# Patient Record
Sex: MALE | URBAN - METROPOLITAN AREA
[De-identification: names, ages, dates, MRNs, and addresses within clinical notes are randomized per-mention and may not be internally consistent; named-entity substitution may affect disease eponyms.]

---

## 2024-04-17 ENCOUNTER — OV NP (OUTPATIENT)
Dept: URBAN - METROPOLITAN AREA CLINIC 60 | Facility: CLINIC | Age: 54
End: 2024-04-17

## 2025-02-27 ENCOUNTER — TELEPHONE ENCOUNTER (OUTPATIENT)
Dept: URBAN - METROPOLITAN AREA CLINIC 60 | Facility: CLINIC | Age: 55
End: 2025-02-27

## 2025-02-28 ENCOUNTER — DASHBOARD ENCOUNTERS (OUTPATIENT)
Age: 55
End: 2025-02-28

## 2025-03-03 ENCOUNTER — OFFICE VISIT (OUTPATIENT)
Dept: URBAN - METROPOLITAN AREA CLINIC 60 | Facility: CLINIC | Age: 55
End: 2025-03-03
Payer: COMMERCIAL

## 2025-03-03 ENCOUNTER — LAB OUTSIDE AN ENCOUNTER (OUTPATIENT)
Dept: URBAN - METROPOLITAN AREA CLINIC 60 | Facility: CLINIC | Age: 55
End: 2025-03-03

## 2025-03-03 VITALS
TEMPERATURE: 98.5 F | WEIGHT: 156.4 LBS | DIASTOLIC BLOOD PRESSURE: 72 MMHG | OXYGEN SATURATION: 99 % | HEART RATE: 78 BPM | BODY MASS INDEX: 21.9 KG/M2 | HEIGHT: 71 IN | RESPIRATION RATE: 12 BRPM | SYSTOLIC BLOOD PRESSURE: 130 MMHG

## 2025-03-03 DIAGNOSIS — K62.5 RECTAL BLEEDING: ICD-10-CM

## 2025-03-03 DIAGNOSIS — K52.89 (LYMPHOCYTIC) MICROSCOPIC COLITIS: ICD-10-CM

## 2025-03-03 DIAGNOSIS — R10.33 PERIUMBILICAL ABDOMINAL PAIN: ICD-10-CM

## 2025-03-03 PROCEDURE — 99204 OFFICE O/P NEW MOD 45 MIN: CPT

## 2025-03-03 RX ORDER — FAMOTIDINE 20 MG/1
TAKE ONE TABLET BY MOUTH ONE TIME DAILY TABLET ORAL
Qty: 14 UNSPECIFIED | Refills: 0 | Status: ACTIVE | COMMUNITY

## 2025-03-03 RX ORDER — PANTOPRAZOLE SODIUM 40 MG/1
1 TABLET 1/2 TO 1 HOUR BEFORE MORNING MEAL TABLET, DELAYED RELEASE ORAL ONCE A DAY
Status: ACTIVE | COMMUNITY

## 2025-03-03 NOTE — HPI-TODAY'S VISIT:
Patient is a 54-year-old male new patient to Dr. Cosby coming in complaining of rectal bleeding, history of stomach ulcers and GERD.  Prior history of drug abuse now referred by PCP to psychiatry. Referred by [facility] for colonoscopy screening.  Past medical history of GERD and depression.  Recently at urgent care 2/20/2025 for concern of rectal bleeding and given referral to GI.  Patient comes in accompanied by his mom.  He complains of intermittent rectal bleeding, diarrhea, abdominal pain ongoing since his teenage years.  She now mostly always has diarrhea and at times bloody diarrhea multiple times per day.  His last episode of rectal bleeding was around a month ago.  Still having multiple [place] type VII stools per day.  Abdominal pain around the umbilicus area that comes and goes.  He reports a history of peptic ulcer disease in his mid 20s.  No prior colonoscopy.  Recent blood work reviewed and summarized below.  Denies any use of blood thinners, presence of pacemaker or defib. Denies history of sleep apnea. No prior MI, TIA, CVA or cardiac stenting. No known cardiac or pulmonary issues.

## 2025-03-03 NOTE — PHYSICAL EXAM GASTROINTESTINAL
Abdomen , soft, nontender, nondistended , no guarding or rigidity , no masses palpable , normal bowel sounds , Liver and Spleen,  no hepatosplenomegaly , liver nontender. defers rectal

## 2025-03-04 ENCOUNTER — CLAIMS CREATED FROM THE CLAIM WINDOW (OUTPATIENT)
Dept: URBAN - METROPOLITAN AREA SURGERY CENTER 4 | Facility: SURGERY CENTER | Age: 55
End: 2025-03-04
Payer: COMMERCIAL

## 2025-03-04 ENCOUNTER — CLAIMS CREATED FROM THE CLAIM WINDOW (OUTPATIENT)
Dept: URBAN - METROPOLITAN AREA CLINIC 4 | Facility: CLINIC | Age: 55
End: 2025-03-04
Payer: COMMERCIAL

## 2025-03-04 DIAGNOSIS — K21.9 GASTRO-ESOPHAGEAL REFLUX DISEASE WITHOUT ESOPHAGITIS: ICD-10-CM

## 2025-03-04 DIAGNOSIS — K31.7 POLYP OF STOMACH AND DUODENUM: ICD-10-CM

## 2025-03-04 DIAGNOSIS — K31.7 GASTRIC POLYP: ICD-10-CM

## 2025-03-04 DIAGNOSIS — K29.00 ACUTE GASTRITIS WITHOUT HEMORRHAGE, UNSPECIFIED GASTRITIS TYPE: ICD-10-CM

## 2025-03-04 DIAGNOSIS — K44.9 DIAPHRAGMATIC HERNIA WITHOUT OBSTRUCTION OR GANGRENE: ICD-10-CM

## 2025-03-04 DIAGNOSIS — Z93.4 GASTROJEJUNOSTOMY TUBE STATUS: ICD-10-CM

## 2025-03-04 DIAGNOSIS — K29.70 GASTRITIS, UNSPECIFIED, WITHOUT BLEEDING: ICD-10-CM

## 2025-03-04 DIAGNOSIS — Z98.0 HISTORY OF BYPASS GASTROJEJUNOSTOMY: ICD-10-CM

## 2025-03-04 DIAGNOSIS — K31.89 OTHER DISEASES OF STOMACH AND DUODENUM: ICD-10-CM

## 2025-03-04 PROCEDURE — 88313 SPECIAL STAINS GROUP 2: CPT | Performed by: PATHOLOGY

## 2025-03-04 PROCEDURE — 43239 EGD BIOPSY SINGLE/MULTIPLE: CPT | Performed by: INTERNAL MEDICINE

## 2025-03-04 PROCEDURE — 88305 TISSUE EXAM BY PATHOLOGIST: CPT | Performed by: PATHOLOGY

## 2025-03-04 PROCEDURE — 88312 SPECIAL STAINS GROUP 1: CPT | Performed by: PATHOLOGY

## 2025-03-04 PROCEDURE — 00731 ANES UPR GI NDSC PX NOS: CPT | Performed by: NURSE ANESTHETIST, CERTIFIED REGISTERED

## 2025-03-04 RX ORDER — FAMOTIDINE 20 MG/1
TAKE ONE TABLET BY MOUTH ONE TIME DAILY TABLET ORAL
Qty: 14 UNSPECIFIED | Refills: 0 | Status: ACTIVE | COMMUNITY

## 2025-03-04 RX ORDER — PANTOPRAZOLE SODIUM 40 MG/1
1 TABLET 1/2 TO 1 HOUR BEFORE MORNING MEAL TABLET, DELAYED RELEASE ORAL ONCE A DAY
Status: ACTIVE | COMMUNITY

## 2025-03-10 ENCOUNTER — TELEPHONE ENCOUNTER (OUTPATIENT)
Dept: URBAN - METROPOLITAN AREA CLINIC 64 | Facility: CLINIC | Age: 55
End: 2025-03-10

## 2025-03-12 ENCOUNTER — TELEPHONE ENCOUNTER (OUTPATIENT)
Dept: URBAN - METROPOLITAN AREA CLINIC 60 | Facility: CLINIC | Age: 55
End: 2025-03-12

## 2025-03-13 ENCOUNTER — OFFICE VISIT (OUTPATIENT)
Dept: URBAN - METROPOLITAN AREA SURGERY CENTER 4 | Facility: SURGERY CENTER | Age: 55
End: 2025-03-13
Payer: COMMERCIAL

## 2025-03-13 ENCOUNTER — LAB OUTSIDE AN ENCOUNTER (OUTPATIENT)
Dept: URBAN - METROPOLITAN AREA SURGERY CENTER 4 | Facility: SURGERY CENTER | Age: 55
End: 2025-03-13

## 2025-03-13 ENCOUNTER — CLAIMS CREATED FROM THE CLAIM WINDOW (OUTPATIENT)
Dept: URBAN - METROPOLITAN AREA CLINIC 4 | Facility: CLINIC | Age: 55
End: 2025-03-13
Payer: COMMERCIAL

## 2025-03-13 DIAGNOSIS — K57.30 DIVERTICULOSIS OF LARGE INTESTINE WITHOUT PERFORATION OR ABSCESS WITHOUT BLEEDING: ICD-10-CM

## 2025-03-13 DIAGNOSIS — K64.1 SECOND DEGREE HEMORRHOIDS: ICD-10-CM

## 2025-03-13 DIAGNOSIS — R19.7 DIARRHEA, UNSPECIFIED TYPE: ICD-10-CM

## 2025-03-13 DIAGNOSIS — K92.1 HEMATOCHEZIA: ICD-10-CM

## 2025-03-13 DIAGNOSIS — K63.89 OTHER SPECIFIED DISEASES OF INTESTINE: ICD-10-CM

## 2025-03-13 PROCEDURE — 88342 IMHCHEM/IMCYTCHM 1ST ANTB: CPT | Performed by: PATHOLOGY

## 2025-03-13 PROCEDURE — 88313 SPECIAL STAINS GROUP 2: CPT | Performed by: PATHOLOGY

## 2025-03-13 PROCEDURE — 88305 TISSUE EXAM BY PATHOLOGIST: CPT | Performed by: PATHOLOGY

## 2025-03-13 PROCEDURE — 45380 COLONOSCOPY AND BIOPSY: CPT | Performed by: INTERNAL MEDICINE

## 2025-03-13 PROCEDURE — 00811 ANES LWR INTST NDSC NOS: CPT | Performed by: NURSE ANESTHETIST, CERTIFIED REGISTERED

## 2025-03-13 RX ORDER — PANTOPRAZOLE SODIUM 40 MG/1
1 TABLET 1/2 TO 1 HOUR BEFORE MORNING MEAL TABLET, DELAYED RELEASE ORAL ONCE A DAY
Status: ACTIVE | COMMUNITY

## 2025-03-13 RX ORDER — FAMOTIDINE 20 MG/1
TAKE ONE TABLET BY MOUTH ONE TIME DAILY TABLET ORAL
Qty: 14 UNSPECIFIED | Refills: 0 | Status: ACTIVE | COMMUNITY

## 2025-03-24 ENCOUNTER — LAB OUTSIDE AN ENCOUNTER (OUTPATIENT)
Dept: URBAN - METROPOLITAN AREA CLINIC 60 | Facility: CLINIC | Age: 55
End: 2025-03-24

## 2025-03-24 ENCOUNTER — OFFICE VISIT (OUTPATIENT)
Dept: URBAN - METROPOLITAN AREA CLINIC 60 | Facility: CLINIC | Age: 55
End: 2025-03-24
Payer: COMMERCIAL

## 2025-03-24 DIAGNOSIS — K52.9 CHRONIC DIARRHEA: ICD-10-CM

## 2025-03-24 DIAGNOSIS — R11.0 NAUSEA: ICD-10-CM

## 2025-03-24 DIAGNOSIS — K63.89 CECUM MASS: ICD-10-CM

## 2025-03-24 DIAGNOSIS — R10.33 PERIUMBILICAL ABDOMINAL PAIN: ICD-10-CM

## 2025-03-24 PROCEDURE — 99214 OFFICE O/P EST MOD 30 MIN: CPT

## 2025-03-24 RX ORDER — POLYETHYLENE GLYCOL 3350, SODIUM CHLORIDE, SODIUM BICARBONATE, POTASSIUM CHLORIDE 420; 11.2; 5.72; 1.48 G/4L; G/4L; G/4L; G/4L
AS DIRECTED POWDER, FOR SOLUTION ORAL ONCE
Qty: 4000 | Refills: 0 | OUTPATIENT
Start: 2025-03-24 | End: 2025-03-25

## 2025-03-24 RX ORDER — PANTOPRAZOLE SODIUM 40 MG/1
1 TABLET 1/2 TO 1 HOUR BEFORE MORNING MEAL TABLET, DELAYED RELEASE ORAL ONCE A DAY
Status: ACTIVE | COMMUNITY

## 2025-03-24 RX ORDER — ONDANSETRON HYDROCHLORIDE 4 MG/1
1 TABLET TABLET, FILM COATED ORAL
Qty: 90 TABLET | Refills: 0 | OUTPATIENT
Start: 2025-03-24

## 2025-03-24 RX ORDER — FAMOTIDINE 20 MG/1
TAKE ONE TABLET BY MOUTH ONE TIME DAILY TABLET ORAL
Qty: 14 UNSPECIFIED | Refills: 0 | Status: ACTIVE | COMMUNITY

## 2025-03-24 NOTE — HPI-PREVIOUS PROCEDURES
EGD by Dr. Cosby 3/4/2025 noted irregular Z-line, 3 cm hiatal hernia, single gastric polyp, acute gastritis, gastrojejunostomy found characterized by healthy-appearing mucosa and normal second portion of duodenum.  Duodenal biopsy with no significant abnormality.  Stomach biopsy with reactive gastropathy negative for H. pylori.  GE junction biopsy with reflux type changes negative for Goodman's.  Fundic land polyp found on biopsy.  Colonoscopy 3/13/2025 by Dr. Cosby noted unsatisfactory preparation of colon, normal TI, normal mucosa in the entire examined colon, inflamed internal hemorrhoids and stool in the entire examined colon.  Recommended 3-month recall due to suboptimal bowel preparation.  EGD around 30 years diagnosed with PUD per patient

## 2025-03-24 NOTE — HPI-TODAY'S VISIT:
Patient comes in for EGD/colon f/u as well as imaging f/u. Patient is accompanied by his mother. We review EGD which showed gastritis negative biopsy, evidence of gastrojejunostomy which patient states is from 2 decades ago when he had a peptic ulcer removed surgically.  His colonoscopy unfortunately had a poor prep and he was recommended to follow-up in 3 months.  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  Patient continues with same symptoms for last OV.  Denies signs of GI bleeding but still with intermittent periumbilical pain as well as intermittent diarrhea.  Associated intermittent nausea as well.  We will schedule him for ASAP repeat colonoscopy due to symptoms and CT findings.  Last OV 3/3/2025: Patient is a 54-year-old male new patient to Dr. Cosby coming in complaining of rectal bleeding, history of stomach ulcers and GERD.  Prior history of drug abuse now referred by PCP to psychiatry. Referred by [facility] for colonoscopy screening.  Past medical history of GERD and depression.  Recently at urgent care 2/20/2025 for concern of rectal bleeding and given referral to GI.  Patient comes in accompanied by his mom.  He complains of intermittent rectal bleeding, diarrhea, abdominal pain ongoing since his teenage years.  She now mostly always has diarrhea and at times bloody diarrhea multiple times per day.  His last episode of rectal bleeding was around a month ago.  Still having multiple [place] type VII stools per day.  Abdominal pain around the umbilicus area that comes and goes.  He reports a history of peptic ulcer disease in his mid 20s.  No prior colonoscopy.  Recent blood work reviewed and summarized below.  Denies any use of blood thinners, presence of pacemaker or defib. Denies history of sleep apnea. No prior MI, TIA, CVA or cardiac stenting. No known cardiac or pulmonary issues.

## 2025-03-26 ENCOUNTER — OFFICE VISIT (OUTPATIENT)
Dept: URBAN - METROPOLITAN AREA SURGERY CENTER 4 | Facility: SURGERY CENTER | Age: 55
End: 2025-03-26
Payer: COMMERCIAL

## 2025-03-26 ENCOUNTER — CLAIMS CREATED FROM THE CLAIM WINDOW (OUTPATIENT)
Dept: URBAN - METROPOLITAN AREA CLINIC 4 | Facility: CLINIC | Age: 55
End: 2025-03-26
Payer: COMMERCIAL

## 2025-03-26 DIAGNOSIS — K64.1 SECOND DEGREE HEMORRHOIDS: ICD-10-CM

## 2025-03-26 DIAGNOSIS — K57.30 DIVERTICULOSIS OF LARGE INTESTINE WITHOUT PERFORATION OR ABSCESS WITHOUT BLEEDING: ICD-10-CM

## 2025-03-26 DIAGNOSIS — K63.89 OTHER SPECIFIED DISEASES OF INTESTINE: ICD-10-CM

## 2025-03-26 DIAGNOSIS — K63.5 COLON POLYP: ICD-10-CM

## 2025-03-26 DIAGNOSIS — K62.1 RECTAL POLYP: ICD-10-CM

## 2025-03-26 DIAGNOSIS — D12.3 BENIGN NEOPLASM OF TRANSVERSE COLON: ICD-10-CM

## 2025-03-26 PROCEDURE — 45380 COLONOSCOPY AND BIOPSY: CPT | Performed by: INTERNAL MEDICINE

## 2025-03-26 PROCEDURE — 45385 COLONOSCOPY W/LESION REMOVAL: CPT | Performed by: INTERNAL MEDICINE

## 2025-03-26 PROCEDURE — 88305 TISSUE EXAM BY PATHOLOGIST: CPT | Performed by: PATHOLOGY

## 2025-03-26 RX ORDER — ONDANSETRON HYDROCHLORIDE 4 MG/1
1 TABLET TABLET, FILM COATED ORAL
Qty: 90 TABLET | Refills: 0 | Status: ACTIVE | COMMUNITY
Start: 2025-03-24

## 2025-03-26 RX ORDER — PANTOPRAZOLE SODIUM 40 MG/1
1 TABLET 1/2 TO 1 HOUR BEFORE MORNING MEAL TABLET, DELAYED RELEASE ORAL ONCE A DAY
Status: ACTIVE | COMMUNITY

## 2025-03-26 RX ORDER — FAMOTIDINE 20 MG/1
TAKE ONE TABLET BY MOUTH ONE TIME DAILY TABLET ORAL
Qty: 14 UNSPECIFIED | Refills: 0 | Status: ACTIVE | COMMUNITY

## 2025-03-27 ENCOUNTER — LAB OUTSIDE AN ENCOUNTER (OUTPATIENT)
Dept: URBAN - METROPOLITAN AREA CLINIC 63 | Facility: CLINIC | Age: 55
End: 2025-03-27

## 2025-03-31 LAB
CAMPYLOBACTER SPP. AG,EIA: (no result)
SALMONELLA AND SHIGELLA, CULTURE: (no result)
SHIGA TOXINS, EIA W/RFL TO E.COLI O157 CULTURE: (no result)

## 2025-04-03 ENCOUNTER — TELEPHONE ENCOUNTER (OUTPATIENT)
Dept: URBAN - METROPOLITAN AREA CLINIC 63 | Facility: CLINIC | Age: 55
End: 2025-04-03

## 2025-04-03 ENCOUNTER — LAB OUTSIDE AN ENCOUNTER (OUTPATIENT)
Dept: URBAN - METROPOLITAN AREA CLINIC 63 | Facility: CLINIC | Age: 55
End: 2025-04-03

## 2025-04-09 ENCOUNTER — OFFICE VISIT (OUTPATIENT)
Dept: URBAN - METROPOLITAN AREA CLINIC 60 | Facility: CLINIC | Age: 55
End: 2025-04-09
Payer: COMMERCIAL

## 2025-04-09 ENCOUNTER — TELEPHONE ENCOUNTER (OUTPATIENT)
Dept: URBAN - METROPOLITAN AREA CLINIC 60 | Facility: CLINIC | Age: 55
End: 2025-04-09

## 2025-04-09 DIAGNOSIS — K64.9 BLEEDING HEMORRHOIDS: ICD-10-CM

## 2025-04-09 DIAGNOSIS — K21.9 CHRONIC GERD WITHOUT ESOPHAGITIS: ICD-10-CM

## 2025-04-09 DIAGNOSIS — R10.33 PERIUMBILICAL ABDOMINAL PAIN: ICD-10-CM

## 2025-04-09 DIAGNOSIS — K62.5 RECTAL BLEEDING: ICD-10-CM

## 2025-04-09 DIAGNOSIS — K59.04 CHRONIC IDIOPATHIC CONSTIPATION: ICD-10-CM

## 2025-04-09 PROCEDURE — 99214 OFFICE O/P EST MOD 30 MIN: CPT

## 2025-04-09 RX ORDER — PANTOPRAZOLE SODIUM 40 MG/1
1 TABLET 1/2 TO 1 HOUR BEFORE MORNING MEAL TABLET, DELAYED RELEASE ORAL ONCE A DAY
Status: ACTIVE | COMMUNITY

## 2025-04-09 RX ORDER — PANTOPRAZOLE SODIUM 40 MG/1
1 TABLET TABLET, DELAYED RELEASE ORAL TWICE A DAY
Qty: 60 TABLET | Refills: 1 | OUTPATIENT
Start: 2025-04-09

## 2025-04-09 RX ORDER — FAMOTIDINE 20 MG/1
TAKE ONE TABLET BY MOUTH ONE TIME DAILY TABLET ORAL
Qty: 14 UNSPECIFIED | Refills: 0 | Status: ACTIVE | COMMUNITY

## 2025-04-09 NOTE — HPI-PREVIOUS PROCEDURES
Colonoscopy 3/26/2025 by Dr. Cosby noted to 5 to 10 mm polyps in the transverse colon, two 4 to 6 mm polyps in the rectum, erythematous mucosa in the distal rectum, diverticulosis, redundant colon, external and internal hemorrhoids.  Repeat colonoscopy 3 years.  No cecal polyp or mass noted to explain findings on CT.  Recommended CT enterography to follow-up.  Rectum biopsy negative for significant abnormality.  Rectum polyp hyperplastic.  Transverse colon polyp TA.  EGD by Dr. Cosby 3/4/2025 noted irregular Z-line, 3 cm hiatal hernia, single gastric polyp, acute gastritis, gastrojejunostomy found characterized by healthy-appearing mucosa and normal second portion of duodenum.  Duodenal biopsy with no significant abnormality.  Stomach biopsy with reactive gastropathy negative for H. pylori.  GE junction biopsy with reflux type changes negative for Goodman's.  Fundic land polyp found on biopsy.  Colonoscopy 3/13/2025 by Dr. Cosby noted unsatisfactory preparation of colon, normal TI, normal mucosa in the entire examined colon, inflamed internal hemorrhoids and stool in the entire examined colon.  Recommended 3-month recall due to suboptimal bowel preparation.  EGD around 30 years diagnosed with PUD per patient

## 2025-04-09 NOTE — HPI-TODAY'S VISIT:
Patient is here for colonoscopy f/u visit. Accompanied by his mother. We reviewed the results of today's visit.  No sign of any mass at the level of ileocecal valve that was previously seen on CT imaging.  Finding of a few polyps that were a mix of hyperplastic and TA.  Patient recommended CT enterography for further eval of the small bowel and that was ordered today and reviewed with patient to complete.  He still has very similar symptoms as previously.  Still having intermittent generalized abdominal pain as well as intermittent rectal bleeding which after colonoscopy is likely due to hemorrhoids.  Patient request referral to colorectal surgery for consultation and 1 will be ordered today.  Additionally having a lot of heartburn symptoms so we will increase his pantoprazole 40 mg to twice daily.  We discussed his history of constipation and reviewed continuing on MiraLAX and Metamucil daily.  Last OV 3/24/2025: Patient comes in for EGD/colon f/u as well as imaging f/u. Patient is accompanied by his mother. We review EGD which showed gastritis negative biopsy, evidence of gastrojejunostomy which patient states is from 2 decades ago when he had a peptic ulcer removed surgically.  His colonoscopy unfortunately had a poor prep and he was recommended to follow-up in 3 months.  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  Patient continues with same symptoms for last OV.  Denies signs of GI bleeding but still with intermittent periumbilical pain as well as intermittent diarrhea.  Associated intermittent nausea as well.  We will schedule him for ASAP repeat colonoscopy due to symptoms and CT findings.  Last OV 3/3/2025: Patient is a 54-year-old male new patient to Dr. Cosby coming in complaining of rectal bleeding, history of stomach ulcers and GERD.  Prior history of drug abuse now referred by PCP to psychiatry. Referred by [facility] for colonoscopy screening.  Past medical history of GERD and depression.  Recently at urgent care 2/20/2025 for concern of rectal bleeding and given referral to GI.  Patient comes in accompanied by his mom.  He complains of intermittent rectal bleeding, diarrhea, abdominal pain ongoing since his teenage years.  She now mostly always has diarrhea and at times bloody diarrhea multiple times per day.  His last episode of rectal bleeding was around a month ago.  Still having multiple [place] type VII stools per day.  Abdominal pain around the umbilicus area that comes and goes.  He reports a history of peptic ulcer disease in his mid 20s.  No prior colonoscopy.  Recent blood work reviewed and summarized below.  Denies any use of blood thinners, presence of pacemaker or defib. Denies history of sleep apnea. No prior MI, TIA, CVA or cardiac stenting. No known cardiac or pulmonary issues.

## 2025-04-09 NOTE — HPI-PREVIOUS IMAGING
CT abdomen pelvis with contrast 3/14/2025 noted normal stomach, no small or large bowel obstruction, small intestine appears normal, and cecum at level of ileocecal valve there is an apparent semicircular lesion which measures 4 x 4 x 4.5 cm with the rest of right colon and hepatic flexure normal as well as proximal transverse colon, short segment of abnormal thickening is demonstrated in the mid transverse colon with mild pericolonic fat stranding with these findings nonspecific and may represent mild colitis of uncertain etiology

## 2025-04-10 ENCOUNTER — TELEPHONE ENCOUNTER (OUTPATIENT)
Dept: URBAN - METROPOLITAN AREA CLINIC 63 | Facility: CLINIC | Age: 55
End: 2025-04-10

## 2025-04-11 ENCOUNTER — LAB OUTSIDE AN ENCOUNTER (OUTPATIENT)
Dept: URBAN - METROPOLITAN AREA CLINIC 63 | Facility: CLINIC | Age: 55
End: 2025-04-11

## 2025-04-14 ENCOUNTER — TELEPHONE ENCOUNTER (OUTPATIENT)
Dept: URBAN - METROPOLITAN AREA CLINIC 63 | Facility: CLINIC | Age: 55
End: 2025-04-14

## 2025-04-15 ENCOUNTER — OFFICE VISIT (OUTPATIENT)
Dept: URBAN - METROPOLITAN AREA SURGERY CENTER 4 | Facility: SURGERY CENTER | Age: 55
End: 2025-04-15

## 2025-04-16 ENCOUNTER — TELEPHONE ENCOUNTER (OUTPATIENT)
Dept: URBAN - METROPOLITAN AREA CLINIC 63 | Facility: CLINIC | Age: 55
End: 2025-04-16

## 2025-04-23 PROBLEM — 82934008: Status: ACTIVE | Noted: 2025-04-23

## 2025-05-01 ENCOUNTER — LAB OUTSIDE AN ENCOUNTER (OUTPATIENT)
Dept: URBAN - METROPOLITAN AREA CLINIC 63 | Facility: CLINIC | Age: 55
End: 2025-05-01

## 2025-06-08 ENCOUNTER — TELEPHONE ENCOUNTER (OUTPATIENT)
Dept: URBAN - METROPOLITAN AREA CLINIC 63 | Facility: CLINIC | Age: 55
End: 2025-06-08

## 2025-07-07 ENCOUNTER — OFFICE VISIT (OUTPATIENT)
Dept: URBAN - METROPOLITAN AREA CLINIC 60 | Facility: CLINIC | Age: 55
End: 2025-07-07
Payer: COMMERCIAL

## 2025-07-07 DIAGNOSIS — K64.9 BLEEDING HEMORRHOIDS: ICD-10-CM

## 2025-07-07 DIAGNOSIS — R10.33 PERIUMBILICAL ABDOMINAL PAIN: ICD-10-CM

## 2025-07-07 DIAGNOSIS — K59.04 CHRONIC IDIOPATHIC CONSTIPATION: ICD-10-CM

## 2025-07-07 DIAGNOSIS — K62.5 RECTAL BLEEDING: ICD-10-CM

## 2025-07-07 DIAGNOSIS — K21.9 CHRONIC GERD WITHOUT ESOPHAGITIS: ICD-10-CM

## 2025-07-07 PROCEDURE — 99214 OFFICE O/P EST MOD 30 MIN: CPT

## 2025-07-07 RX ORDER — ROPINIROLE HYDROCHLORIDE 0.25 MG/1
1 TABLET 1 TO 3 HOURS BEFORE BEDTIME TABLET, FILM COATED ORAL ONCE A DAY
Status: ACTIVE | COMMUNITY

## 2025-07-07 RX ORDER — FAMOTIDINE 20 MG/1
TAKE ONE TABLET BY MOUTH ONE TIME DAILY TABLET ORAL
Qty: 14 UNSPECIFIED | Refills: 0 | Status: ACTIVE | COMMUNITY

## 2025-07-07 RX ORDER — PANTOPRAZOLE SODIUM 40 MG/1
1 TABLET TABLET, DELAYED RELEASE ORAL TWICE A DAY
Qty: 60 TABLET | Refills: 1 | Status: ACTIVE | COMMUNITY
Start: 2025-04-09

## 2025-07-07 RX ORDER — ESCITALOPRAM OXALATE 20 MG/1
1 TABLET TABLET ORAL ONCE A DAY
Status: ACTIVE | COMMUNITY

## 2025-07-07 RX ORDER — TRAZODONE HYDROCHLORIDE 50 MG/1
1 TABLET AT BEDTIME AS NEEDED TABLET ORAL ONCE A DAY
Status: ACTIVE | COMMUNITY

## 2025-07-07 RX ORDER — QUETIAPINE 50 MG/1
1 TABLET AT BEDTIME TABLET, FILM COATED ORAL ONCE A DAY
Status: ACTIVE | COMMUNITY

## 2025-07-07 RX ORDER — ONDANSETRON 4 MG/1
1 TABLET ON THE TONGUE AND ALLOW TO DISSOLVE TABLET, ORALLY DISINTEGRATING ORAL ONCE A DAY
Status: ACTIVE | COMMUNITY

## 2025-07-07 RX ORDER — CLONIDINE HYDROCHLORIDE 0.1 MG/1
1 TABLET TABLET ORAL ONCE A DAY
Status: ACTIVE | COMMUNITY

## 2025-07-07 RX ORDER — HYDROXYZINE PAMOATE 50 MG/1
1 CAPSULE AT BEDTIME AS NEEDED CAPSULE ORAL ONCE A DAY
Status: ACTIVE | COMMUNITY

## 2025-07-07 RX ORDER — MELATONIN 5 MG
1 TABLET IN THE EVENING TABLET ORAL ONCE A DAY
Status: ACTIVE | COMMUNITY

## 2025-07-07 RX ORDER — TRAZODONE HYDROCHLORIDE 100 MG/1
1 TABLET AT BEDTIME TABLET ORAL ONCE A DAY
Status: ACTIVE | COMMUNITY

## 2025-07-07 NOTE — HPI-PREVIOUS IMAGING
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 - Prostatomegaly with asymmetrical enhancement of peripheral zones correlation with PSA recommended  CT abdomen pelvis with contrast 3/14/2025 noted normal stomach, no small or large bowel obstruction, small intestine appears normal, and cecum at level of ileocecal valve there is an apparent semicircular lesion which measures 4 x 4 x 4.5 cm with the rest of right colon and hepatic flexure normal as well as proximal transverse colon, short segment of abnormal thickening is demonstrated in the mid transverse colon with mild pericolonic fat stranding with these findings nonspecific and may represent mild colitis of uncertain etiology

## 2025-07-07 NOTE — HPI-TODAY'S VISIT:
Patient recently in ED 6/26/2025 admitted for observation and discharged 6/27/2025.  Patient was found unresponsive and brought to the ER following cocaine use earlier that evening.  Narcan was administered.  Drug screening positive for amphetamines, cannabinoid, cocaine and fentanyl.  Found new diagnosis of atrial fibrillation with RVR which required diltiazem and anticoagulation.  We discussed updated imaging the patient had completed including MRI with MRCP and CT enterography both which are summarized below.  Patient was referred for possible EUS with Dr. Covington but patient has not scheduled an appointment.  It looks like per [facility] records patient was reached out to 3 times and mailed a letter in order to schedule.  He states he has been undergoing a lot of things and currently is in rehabilitation drug treatment program.  We give him Dr. Covington's number to call to schedule.  Still having intermittent bright red bleeding but we will hold off on colorectal surgeon consultation given patient would like to complete his rehabilitation program as well as follow-up with Dr. Covington first.  OV 4/2025: Patient is here for colonoscopy f/u visit. Accompanied by his mother. We reviewed the results of today's visit.  No sign of any mass at the level of ileocecal valve that was previously seen on CT imaging.  Finding of a few polyps that were a mix of hyperplastic and TA.  Patient recommended CT enterography for further eval of the small bowel and that was ordered today and reviewed with patient to complete.  He still has very similar symptoms as previously.  Still having intermittent generalized abdominal pain as well as intermittent rectal bleeding which after colonoscopy is likely due to hemorrhoids.  Patient request referral to colorectal surgery for consultation and 1 will be ordered today.  Additionally having a lot of heartburn symptoms so we will increase his pantoprazole 40 mg to twice daily.  We discussed his history of constipation and reviewed continuing on MiraLAX and Metamucil daily.  Last OV 3/24/2025: Patient comes in for EGD/colon f/u as well as imaging f/u. Patient is accompanied by his mother. We review EGD which showed gastritis negative biopsy, evidence of gastrojejunostomy which patient states is from 2 decades ago when he had a peptic ulcer removed surgically.  His colonoscopy unfortunately had a poor prep and he was recommended to follow-up in 3 months.  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  Patient continues with same symptoms for last OV.  Denies signs of GI bleeding but still with intermittent periumbilical pain as well as intermittent diarrhea.  Associated intermittent nausea as well.  We will schedule him for ASAP repeat colonoscopy due to symptoms and CT findings.  Last OV 3/3/2025: Patient is a 54-year-old male new patient to Dr. Cosby coming in complaining of rectal bleeding, history of stomach ulcers and GERD.  Prior history of drug abuse now referred by PCP to psychiatry. Referred by [facility] for colonoscopy screening.  Past medical history of GERD and depression.  Recently at urgent care 2/20/2025 for concern of rectal bleeding and given referral to GI.  Patient comes in accompanied by his mom.  He complains of intermittent rectal bleeding, diarrhea, abdominal pain ongoing since his teenage years.  She now mostly always has diarrhea and at times bloody diarrhea multiple times per day.  His last episode of rectal bleeding was around a month ago.  Still having multiple [place] type VII stools per day.  Abdominal pain around the umbilicus area that comes and goes.  He reports a history of peptic ulcer disease in his mid 20s.  No prior colonoscopy.  Recent blood work reviewed and summarized below.  Denies any use of blood thinners, presence of pacemaker or defib. Denies history of sleep apnea. No prior MI, TIA, CVA or cardiac stenting. No known cardiac or pulmonary issues.

## 2025-07-24 ENCOUNTER — P2P PATIENT RECORD (OUTPATIENT)
Age: 55
End: 2025-07-24